# Patient Record
Sex: FEMALE | Race: WHITE | URBAN - METROPOLITAN AREA
[De-identification: names, ages, dates, MRNs, and addresses within clinical notes are randomized per-mention and may not be internally consistent; named-entity substitution may affect disease eponyms.]

---

## 2020-12-09 ENCOUNTER — OFFICE VISIT (OUTPATIENT)
Dept: URBAN - METROPOLITAN AREA CLINIC 84 | Facility: CLINIC | Age: 70
End: 2020-12-09

## 2021-01-12 PROBLEM — 35489007 DEPRESSION: Status: ACTIVE | Noted: 2021-01-12

## 2021-01-12 PROBLEM — 57054005 ACUTE MYOCARDIAL INFARCTION: Status: ACTIVE | Noted: 2021-01-12

## 2021-01-12 PROBLEM — 38341003 HYPERTENSION: Status: ACTIVE | Noted: 2021-01-12

## 2021-01-12 PROBLEM — 13644009 HYPERCHOLESTEROLEMIA: Status: ACTIVE | Noted: 2021-01-12

## 2021-01-12 PROBLEM — 53741008 CORONARY ARTERY DISEASE: Status: ACTIVE | Noted: 2021-01-12

## 2021-01-12 PROBLEM — 34000006 CROHN'S DISEASE: Status: ACTIVE | Noted: 2021-01-12

## 2021-01-13 ENCOUNTER — OFFICE VISIT (OUTPATIENT)
Dept: URBAN - METROPOLITAN AREA CLINIC 46 | Facility: CLINIC | Age: 71
End: 2021-01-13

## 2021-01-13 PROBLEM — 70153002 HEMORRHOIDS: Status: ACTIVE | Noted: 2021-01-13

## 2021-01-13 PROBLEM — 4556007 GASTRITIS: Status: ACTIVE | Noted: 2021-01-13

## 2021-01-13 PROBLEM — 84089009 HIATAL HERNIA: Status: ACTIVE | Noted: 2021-01-13

## 2021-01-13 PROBLEM — 235595009 GASTROESOPHAGEAL REFLUX DISEASE: Status: ACTIVE | Noted: 2021-01-13

## 2021-02-08 ENCOUNTER — OFFICE VISIT (OUTPATIENT)
Dept: URBAN - METROPOLITAN AREA SURGERY CENTER 28 | Facility: SURGERY CENTER | Age: 71
End: 2021-02-08

## 2021-02-08 PROBLEM — 235856003 LIVER DISEASE: Status: ACTIVE | Noted: 2021-02-08

## 2021-02-08 LAB — PDFREPORT1: (no result)

## 2021-02-09 ENCOUNTER — LAB OUTSIDE AN ENCOUNTER (OUTPATIENT)
Dept: URBAN - METROPOLITAN AREA CLINIC 13 | Facility: CLINIC | Age: 71
End: 2021-02-09

## 2021-02-10 ENCOUNTER — OFFICE VISIT (OUTPATIENT)
Dept: URBAN - METROPOLITAN AREA CLINIC 13 | Facility: CLINIC | Age: 71
End: 2021-02-10

## 2021-05-19 ENCOUNTER — WEB ENCOUNTER (OUTPATIENT)
Dept: URBAN - METROPOLITAN AREA CLINIC 84 | Facility: CLINIC | Age: 71
End: 2021-05-19

## 2021-05-19 ENCOUNTER — OFFICE VISIT (OUTPATIENT)
Dept: URBAN - METROPOLITAN AREA CLINIC 84 | Facility: CLINIC | Age: 71
End: 2021-05-19
Payer: MEDICARE

## 2021-05-19 VITALS
HEIGHT: 67 IN | BODY MASS INDEX: 26.53 KG/M2 | TEMPERATURE: 97.4 F | SYSTOLIC BLOOD PRESSURE: 138 MMHG | DIASTOLIC BLOOD PRESSURE: 86 MMHG | RESPIRATION RATE: 16 BRPM | HEART RATE: 70 BPM | WEIGHT: 169 LBS

## 2021-05-19 DIAGNOSIS — I10 HYPERTENSION, UNSPECIFIED TYPE: ICD-10-CM

## 2021-05-19 DIAGNOSIS — Z79.02 LONG TERM (CURRENT) USE OF ANTITHROMBOTICS/ANTIPLATELETS: ICD-10-CM

## 2021-05-19 DIAGNOSIS — R94.5 ABNORMAL LFTS: ICD-10-CM

## 2021-05-19 DIAGNOSIS — K76.0 FATTY LIVER: ICD-10-CM

## 2021-05-19 DIAGNOSIS — Z95.5 HISTORY OF HEART ARTERY STENT: ICD-10-CM

## 2021-05-19 DIAGNOSIS — E78.5 HYPERLIPIDEMIA, UNSPECIFIED HYPERLIPIDEMIA TYPE: ICD-10-CM

## 2021-05-19 PROBLEM — 305058001: Status: ACTIVE | Noted: 2021-05-19

## 2021-05-19 PROBLEM — 428375006: Status: ACTIVE | Noted: 2021-05-19

## 2021-05-19 PROBLEM — 38341003: Status: ACTIVE | Noted: 2021-05-19

## 2021-05-19 PROCEDURE — 99214 OFFICE O/P EST MOD 30 MIN: CPT | Performed by: INTERNAL MEDICINE

## 2021-05-19 RX ORDER — PAROXETINE 10 MG/1
TABLET, FILM COATED ORAL
Qty: 90 UNSPECIFIED | Status: ACTIVE | COMMUNITY

## 2021-05-19 RX ORDER — ESTRADIOL 2 MG/1
TABLET ORAL
Qty: 90 UNSPECIFIED | Status: ACTIVE | COMMUNITY

## 2021-05-19 RX ORDER — METOPROLOL TARTRATE 50 MG/1
TABLET, FILM COATED ORAL
Qty: 180 UNSPECIFIED | Status: ACTIVE | COMMUNITY

## 2021-05-19 RX ORDER — CLOPIDOGREL BISULFATE 75 MG/1
TABLET, FILM COATED ORAL
Qty: 90 UNSPECIFIED | Status: ACTIVE | COMMUNITY

## 2021-05-19 RX ORDER — EZETIMIBE 10 MG/1
TABLET ORAL
Qty: 90 UNSPECIFIED | Status: ACTIVE | COMMUNITY

## 2021-05-19 RX ORDER — LISINOPRIL 20 MG/1
TABLET ORAL
Qty: 180 UNSPECIFIED | Status: ACTIVE | COMMUNITY

## 2021-05-19 RX ORDER — NYSTATIN AND TRIAMCINOLONE ACETONIDE 100000; 1 [USP'U]/G; MG/G
CREAM TOPICAL
Qty: 60 UNSPECIFIED | Status: ACTIVE | COMMUNITY

## 2021-05-19 RX ORDER — ROSUVASTATIN CALCIUM 5 MG/1
TABLET, FILM COATED ORAL
Qty: 45 UNSPECIFIED | Status: ACTIVE | COMMUNITY

## 2021-05-23 LAB
A/G RATIO: 1.5
ALBUMIN: 4
ALKALINE PHOSPHATASE: 72
ALPHA 2-MACROGLOBULINS, QN: 285
ALT (SGPT) P5P: 46
ALT (SGPT): 43
APOLIPOPROTEIN A-1: 150
AST (SGOT) P5P: 61
AST (SGOT): 60
BASO (ABSOLUTE): 0
BASOS: 1
BILIRUBIN, TOTAL: 0.3
BILIRUBIN, TOTAL: 0.4
BUN/CREATININE RATIO: 18
BUN: 14
CALCIUM: 9.3
CARBON DIOXIDE, TOTAL: 20
CHLORIDE: 105
CHOLESTEROL, TOTAL: 135
COMMENT:: (no result)
CREATININE: 0.76
EGFR IF AFRICN AM: 93
EGFR IF NONAFRICN AM: 80
EOS (ABSOLUTE): 0.2
EOS: 3
FIBROSIS SCORE: 0.33
FIBROSIS SCORING:: (no result)
FIBROSIS STAGE: (no result)
GGT: 25
GLOBULIN, TOTAL: 2.6
GLUCOSE, SERUM: 126
GLUCOSE: 130
HAPTOGLOBIN: 104
HEIGHT:: 67
HEMATOCRIT: 42.6
HEMATOLOGY COMMENTS:: (no result)
HEMOGLOBIN: 14.4
IMMATURE CELLS: (no result)
IMMATURE GRANS (ABS): 0
IMMATURE GRANULOCYTES: 0
INR: 1
INTERPRETATIONS:: (no result)
LIMITATIONS:: (no result)
LYMPHS (ABSOLUTE): 1.3
LYMPHS: 16
MCH: 30.8
MCHC: 33.8
MCV: 91
MONOCYTES(ABSOLUTE): 0.6
MONOCYTES: 7
NASH GRADE: (no result)
NASH SCORE: 0.25
NASH SCORING: (no result)
NEUTROPHILS (ABSOLUTE): 6
NEUTROPHILS: 73
NRBC: (no result)
PLATELETS: 60
POTASSIUM: 4.3
PROTEIN, TOTAL: 6.6
PROTHROMBIN TIME: 10.6
RBC: 4.68
RDW: 13.3
SODIUM: 140
STEATOSIS GRADE: (no result)
STEATOSIS GRADING: (no result)
STEATOSIS SCORE: 0.31
TRIGLYCERIDES: 183
WBC: 8.1
WEIGHT:: 117

## 2021-08-15 ENCOUNTER — TELEPHONE ENCOUNTER (OUTPATIENT)
Dept: URBAN - METROPOLITAN AREA CLINIC 92 | Facility: CLINIC | Age: 71
End: 2021-08-15

## 2021-08-18 ENCOUNTER — LAB OUTSIDE AN ENCOUNTER (OUTPATIENT)
Dept: URBAN - METROPOLITAN AREA CLINIC 86 | Facility: CLINIC | Age: 71
End: 2021-08-18

## 2021-08-18 ENCOUNTER — DASHBOARD ENCOUNTERS (OUTPATIENT)
Age: 71
End: 2021-08-18

## 2021-08-18 ENCOUNTER — OFFICE VISIT (OUTPATIENT)
Dept: URBAN - METROPOLITAN AREA CLINIC 86 | Facility: CLINIC | Age: 71
End: 2021-08-18
Payer: MEDICARE

## 2021-08-18 DIAGNOSIS — Z79.899 HIGH RISK MEDICATION USE: ICD-10-CM

## 2021-08-18 DIAGNOSIS — R74.8 ABNORMAL LIVER ENZYMES: ICD-10-CM

## 2021-08-18 DIAGNOSIS — R93.2 ABNORMAL LIVER DIAGNOSTIC IMAGING: ICD-10-CM

## 2021-08-18 DIAGNOSIS — E66.3 OVERWEIGHT: ICD-10-CM

## 2021-08-18 DIAGNOSIS — K76.0 FATTY LIVER: ICD-10-CM

## 2021-08-18 DIAGNOSIS — Z71.89 VACCINE COUNSELING: ICD-10-CM

## 2021-08-18 DIAGNOSIS — Z98.890 HISTORY OF COLONOSCOPY: ICD-10-CM

## 2021-08-18 DIAGNOSIS — K22.2 ESOPHAGEAL OBSTRUCTION: ICD-10-CM

## 2021-08-18 DIAGNOSIS — D69.6 THROMBOCYTOPENIA: ICD-10-CM

## 2021-08-18 DIAGNOSIS — E78.5 HYPERLIPIDEMIA, UNSPECIFIED HYPERLIPIDEMIA TYPE: ICD-10-CM

## 2021-08-18 DIAGNOSIS — K21.9 GASTRO-ESOPHAGEAL REFLUX DISEASE WITHOUT ESOPHAGITIS: ICD-10-CM

## 2021-08-18 DIAGNOSIS — K50.90 CROHN'S DISEASE IN REMISSION: ICD-10-CM

## 2021-08-18 PROBLEM — 274527008: Status: ACTIVE | Noted: 2021-08-15

## 2021-08-18 PROBLEM — 166643006: Status: ACTIVE | Noted: 2021-08-15

## 2021-08-18 PROBLEM — 851000119109: Status: ACTIVE | Noted: 2021-08-15

## 2021-08-18 PROBLEM — 238131007: Status: ACTIVE | Noted: 2021-08-15

## 2021-08-18 PROBLEM — 453861000124107: Status: ACTIVE | Noted: 2021-08-15

## 2021-08-18 PROBLEM — 443913008: Status: ACTIVE | Noted: 2021-08-15

## 2021-08-18 PROCEDURE — 99215 OFFICE O/P EST HI 40 MIN: CPT

## 2021-08-18 RX ORDER — ROSUVASTATIN CALCIUM 5 MG/1
1 TABLET EVERY DAY AND EXTRA ONE TIW TABLET, FILM COATED ORAL ONCE A DAY
COMMUNITY

## 2021-08-18 RX ORDER — NYSTATIN AND TRIAMCINOLONE ACETONIDE 100000; 1 [USP'U]/G; MG/G
CREAM TOPICAL
Qty: 60 UNSPECIFIED | COMMUNITY

## 2021-08-18 RX ORDER — PAROXETINE 10 MG/1
1 TABLET IN THE MORNING TABLET, FILM COATED ORAL ONCE A DAY
COMMUNITY

## 2021-08-18 RX ORDER — EZETIMIBE 10 MG/1
1 TABLET TABLET ORAL ONCE A DAY
COMMUNITY

## 2021-08-18 RX ORDER — LISINOPRIL 20 MG/1
1 TABLET TABLET ORAL ONCE A DAY
COMMUNITY

## 2021-08-18 RX ORDER — ESTRADIOL 2 MG/1
1 TABLET TABLET ORAL ONCE A DAY
COMMUNITY

## 2021-08-18 RX ORDER — METOPROLOL TARTRATE 50 MG/1
1 TABLET WITH FOOD TABLET, FILM COATED ORAL TWICE A DAY
COMMUNITY

## 2021-08-18 RX ORDER — CLOPIDOGREL BISULFATE 75 MG/1
1 TABLET TABLET, FILM COATED ORAL ONCE A DAY
COMMUNITY

## 2021-08-18 NOTE — HPI-TODAY'S VISIT:
Patient is a 71-year-old female who was referred by Dr. Damion Nava to be seen for fatty liver and elevated liver enzymes.  A copy of the note will be sent to the referring provider.  Patient is listed as having had a fatty liver on prior imaging.  Patient noted to be overweight with a BMI of 26.47 back on May 19, 2021 visit.  Prior laboratories done in May 2021 showed a glucose elevated 130 BUN of 14 creatinine 0.76 BUN of 14 creatinine 0.76 sodium 140 potassium 4.3 chloride 105 CO2 of 20 calcium 9.3 albumin 4.0 bilirubin 0.4 alkaline phosphatase 72 AST 60 ALT 43.    Patient had a Belle fibrosure test done showing a 0.33 fibrosis score which would be F1 F2 and a steatosis score of 0.31 and a Belle score of 0.25.  The steatosis score was as 0 to S1 which was minimal steatosis and the Belle score of 0.25 would suggest that the patient is not Belle the least by this noninvasive system.  Important to note that the triglycerides were up on that test of 183 glucose was up at 126 and again the AST and ALT were 61 and 46.  She was asked if she had triglycerides.  She eats a lot of fried and fatty and is trying to work on this.  A lot of people doing med diet. Needs to work on the that issue.  Other labs from that May 2021 show white count 8.1 hemoglobin 14.4 plate count low at 60,000 MCV 91 neutrophils 6.0 INR 1.0.  That low platelets being low is concerning also.   We saw colonoscopy done by Dr. Salvador Fritz on the patient in February 2021 the mention that she had  a polyp 8 mm in the sigmoid colon normal mucosa in the colon and grade 2 internal hemorrhoids there were noted.  Prior January 2021 labs show sodium 141 potassium 4.0 chloride 107 CO2 of 23 BUN of 12 Kren 0.7 glucose 130 calcium 9.5 alk phos 74 AST 51 ALT 37 total bili 0.5 WBC count 7.5 hemoglobin 13.9 platelet count 89 neutrophils were 6.2  April 2021 ultrasound done locally showed portal vein to be patent.  Hepatic artery is patent.  Hepatic veins are patent.  Liver was fatty at 15.9 cm with no lesions.  Overall they felt the liver was fatty.  Plan 1.  Complete screens for liver disease to be sure is fatty liver. 2.  Set up MRI to get better look at liver fat and to contact claudication as well as fibrosis assessment. 3.  Reassess post above.   Stressed to pt the need for social distancing and strict handwashing and wearing a mask and to follow any other new or added CDC recommendations as this is an evolving target.  Duration of the visit  40 was minutes with 10 minutes of chart prep and 30 minutes face to face for the visit today to review the prior info and to make plan and discuss her work up and plans.

## 2021-08-18 NOTE — EXAM-PHYSICAL EXAM
Gen: awake and responsive. Eyes: anicteric, normal lids. Mouth: covered with mask. Nose: covered with mask. Hearing: intact grossly. Neck: trachea midline and no jvd. CV: RRR no s3. Lungs: clear. No wheezes, Abd: Soft, nabs, NR, NT. Spleen trace palpable. ExtL no sig edema, slight palm erythema. Neuro: moves all 4 ext grossly. No asterixis. Skin: no pruritis and slight palm erythema.

## 2021-08-28 ENCOUNTER — TELEPHONE ENCOUNTER (OUTPATIENT)
Dept: URBAN - METROPOLITAN AREA CLINIC 13 | Facility: CLINIC | Age: 71
End: 2021-08-28

## 2021-08-28 RX ORDER — ESOMEPRAZOLE MAGNESIUM 40 MG/1
GRANULE, DELAYED RELEASE ORAL
OUTPATIENT
End: 2021-01-13

## 2021-08-29 ENCOUNTER — TELEPHONE ENCOUNTER (OUTPATIENT)
Dept: URBAN - METROPOLITAN AREA CLINIC 13 | Facility: CLINIC | Age: 71
End: 2021-08-29

## 2021-08-29 RX ORDER — ASPIRIN 81 MG/1
TABLET, COATED ORAL
Status: ACTIVE | COMMUNITY

## 2021-08-29 RX ORDER — EZETIMIBE 10 MG/1
TABLET ORAL
Status: ACTIVE | COMMUNITY

## 2021-08-29 RX ORDER — ROSUVASTATIN CALCIUM 5 MG/1
TABLET, FILM COATED ORAL
Status: ACTIVE | COMMUNITY

## 2021-08-29 RX ORDER — METOPROLOL TARTRATE 50 MG/1
TABLET, FILM COATED ORAL
Status: ACTIVE | COMMUNITY

## 2021-08-29 RX ORDER — CLOPIDOGREL 75 MG/1
TABLET ORAL
Status: ACTIVE | COMMUNITY

## 2021-08-29 RX ORDER — LISINOPRIL 20 MG/1
TABLET ORAL
Status: ACTIVE | COMMUNITY

## 2021-08-29 RX ORDER — ESTRADIOL 2 MG/1
TABLET ORAL
Status: ACTIVE | COMMUNITY

## 2021-08-29 RX ORDER — MEDROXYPROGESTERONE ACETATE 5 MG/1
TABLET ORAL
Status: ACTIVE | COMMUNITY

## 2021-08-29 RX ORDER — PAROXETINE HYDROCHLORIDE 10 MG/1
TABLET, FILM COATED ORAL
Status: ACTIVE | COMMUNITY

## 2021-09-12 PROBLEM — 426549001: Status: ACTIVE | Noted: 2021-08-18

## 2021-09-12 PROBLEM — 235595009: Status: ACTIVE | Noted: 2021-08-18

## 2021-09-12 PROBLEM — 415116008: Status: ACTIVE | Noted: 2021-08-15

## 2021-09-12 PROBLEM — 405247003: Status: ACTIVE | Noted: 2021-08-18

## 2021-09-12 PROBLEM — 55822004: Status: ACTIVE | Noted: 2021-05-19

## 2021-09-12 PROBLEM — 197321007: Status: ACTIVE | Noted: 2021-05-19

## 2021-09-13 ENCOUNTER — TELEPHONE ENCOUNTER (OUTPATIENT)
Dept: URBAN - METROPOLITAN AREA CLINIC 40 | Facility: CLINIC | Age: 71
End: 2021-09-13

## 2021-09-17 ENCOUNTER — OFFICE VISIT (OUTPATIENT)
Dept: URBAN - METROPOLITAN AREA CLINIC 86 | Facility: CLINIC | Age: 71
End: 2021-09-17